# Patient Record
Sex: FEMALE | Race: BLACK OR AFRICAN AMERICAN | NOT HISPANIC OR LATINO
[De-identification: names, ages, dates, MRNs, and addresses within clinical notes are randomized per-mention and may not be internally consistent; named-entity substitution may affect disease eponyms.]

---

## 2022-10-24 PROBLEM — Z00.00 ENCOUNTER FOR PREVENTIVE HEALTH EXAMINATION: Status: ACTIVE | Noted: 2022-10-24

## 2023-01-03 ENCOUNTER — FORM ENCOUNTER (OUTPATIENT)
Age: 43
End: 2023-01-03

## 2023-02-14 ENCOUNTER — APPOINTMENT (OUTPATIENT)
Dept: NEUROLOGY | Facility: CLINIC | Age: 43
End: 2023-02-14
Payer: OTHER GOVERNMENT

## 2023-02-14 ENCOUNTER — NON-APPOINTMENT (OUTPATIENT)
Age: 43
End: 2023-02-14

## 2023-02-14 VITALS
DIASTOLIC BLOOD PRESSURE: 91 MMHG | WEIGHT: 203 LBS | HEART RATE: 120 BPM | HEIGHT: 66 IN | SYSTOLIC BLOOD PRESSURE: 152 MMHG | BODY MASS INDEX: 32.62 KG/M2 | OXYGEN SATURATION: 98 % | TEMPERATURE: 97.7 F

## 2023-02-14 DIAGNOSIS — G70.01 MYASTHENIA GRAVIS WITH (ACUTE) EXACERBATION: ICD-10-CM

## 2023-02-14 PROCEDURE — 99205 OFFICE O/P NEW HI 60 MIN: CPT

## 2023-02-16 PROBLEM — G70.01 MYASTHENIA GRAVIS WITH ACUTE EXACERBATION: Status: ACTIVE | Noted: 2023-02-14

## 2023-02-16 RX ORDER — IMMUNE GLOBULIN 100 MG/ML
10 SOLUTION INTRAVENOUS
Qty: 20 | Refills: 11 | Status: ACTIVE | OUTPATIENT
Start: 2023-02-16

## 2023-02-16 NOTE — HISTORY OF PRESENT ILLNESS
[FreeTextEntry1] : ABRAN MONTIEL is a 43 year who presents with \par \par Referred by Aranza Gutierrez MD\par Neuro in [place] [facility]\par \par Was living in [place], [place]. 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  Was doing better. Then moved to [facility].  started to decline in Nov. 5 falls in last 1.5 weeks. \par \par She can have spasms that cause limb jerks.\par \par Last Monday have facial spasm pulling right face and mouth.\par \par Went for PFTs and had syncope doing the test. \par \par Cell cept started in Feb prior to surgery.  Also had IvIg prior to surgery. Has been on prednisone down to 20mg by July.  Needing to increase and decrease since.  Doing a second more aggressive taper.  Told to do a taper from 60mg 2 weeks ago.  Her strength is not improved since then.\par \par Pyridostigmine causes explosive diarrhea and Imodium 2mg doesn't always help.  Had been on 60mg 4-5 times daily but now down to BID because she has more obligations and doesn't want to end up being incontinent.  Used to be 3.5-4 hour benefit from this medication but has not been as effective\par \par Records indicate that she takes cellcept 500mg BID, prednisone, vit D\par \par Denies diplopia, blurred vision, dysphagia, dysarthria, aphasia, focal weakness or numbness, bowel or bladder dysfunction, imbalance, falls, headaches.\par \par did have her COVID vaccine 1 month before this. \par \par

## 2023-02-16 NOTE — PHYSICAL EXAM
[FreeTextEntry1] : General: this is a pleasant patient in no acute distress\par \par HEENT conjunctiva are normal, no tenderness in head\par \par CV: normal pulses, regular rate and rhythm, no peripheral edema noted\par \par Lungs: breathing is non-labored\par \par abd: soft and non-distended\par \par MSK:\par SLR: \par ELKIN:\par range of motion:\par tinnels: \par spurling:\par Occipital nerve tenderness:\par \par Mental status:\par Alert and oriented to person, place and time, normal speech and comprehension\par \par Cranial Nerves:\par extra-occular movements in tact without nystagmus, except mild limited upgaze. normal saccades and smooth pursuit, Face symmetric and facial strength symmetric, facial sensation symmetric, weakness with bilateral puffing cheeks, R much more than left ptosis. neck flexion 4-/5, ext 4-/5\par \par Motor: normal bulk and tone throughout. no abnormal movements. diffuse weakness proximal 4-/5, distal 4+/5.  major problems rising from chair without arms.\par \par Sensory: in tact and symmetric to vibration, light tough, temperature\par \par Cerebellar: normal finger-nose-finger bilaterally\par \par Reflexes: 2+ in the upper and lower extremities and symmetric.  toes are bilaterally downgoing.\par \par Gait: stable, able to tip toe heel and tandem\par \par Stances:\par Romberg: normal\par \par

## 2023-02-16 NOTE — DATA REVIEWED
[de-identified] : 1/12/2023 MRI brain w/o large empty speedy [de-identified] : A1C 6.4, vit D 11.8 L\par 2/17/2022: achr binding abs 2.39, achr blocking 35\par modulating ab elevated to 52 at one point

## 2023-02-28 ENCOUNTER — TRANSCRIPTION ENCOUNTER (OUTPATIENT)
Age: 43
End: 2023-02-28

## 2023-03-27 ENCOUNTER — FORM ENCOUNTER (OUTPATIENT)
Age: 43
End: 2023-03-27

## 2023-05-09 ENCOUNTER — TRANSCRIPTION ENCOUNTER (OUTPATIENT)
Age: 43
End: 2023-05-09

## 2023-07-07 ENCOUNTER — RX RENEWAL (OUTPATIENT)
Age: 43
End: 2023-07-07

## 2023-07-07 ENCOUNTER — TRANSCRIPTION ENCOUNTER (OUTPATIENT)
Age: 43
End: 2023-07-07

## 2023-09-05 ENCOUNTER — NON-APPOINTMENT (OUTPATIENT)
Age: 43
End: 2023-09-05

## 2023-09-05 ENCOUNTER — RX RENEWAL (OUTPATIENT)
Age: 43
End: 2023-09-05

## 2023-10-05 ENCOUNTER — RX RENEWAL (OUTPATIENT)
Age: 43
End: 2023-10-05

## 2023-10-30 ENCOUNTER — RX RENEWAL (OUTPATIENT)
Age: 43
End: 2023-10-30

## 2023-10-30 RX ORDER — PREDNISONE 10 MG/1
10 TABLET ORAL
Qty: 120 | Refills: 0 | Status: ACTIVE | COMMUNITY
Start: 2023-02-16 | End: 1900-01-01

## 2023-11-29 ENCOUNTER — APPOINTMENT (OUTPATIENT)
Dept: NEUROLOGY | Facility: CLINIC | Age: 43
End: 2023-11-29

## 2024-01-24 RX ORDER — MYCOPHENOLATE MOFETIL 500 MG/1
500 TABLET ORAL
Qty: 270 | Refills: 1 | Status: ACTIVE | COMMUNITY
Start: 2023-02-16 | End: 1900-01-01

## 2024-09-23 ENCOUNTER — RX RENEWAL (OUTPATIENT)
Age: 44
End: 2024-09-23